# Patient Record
Sex: FEMALE | URBAN - METROPOLITAN AREA
[De-identification: names, ages, dates, MRNs, and addresses within clinical notes are randomized per-mention and may not be internally consistent; named-entity substitution may affect disease eponyms.]

---

## 2024-03-20 ENCOUNTER — OFFICE VISIT (OUTPATIENT)
Age: 74
End: 2024-03-20

## 2024-03-20 DIAGNOSIS — M21.612 BUNION OF GREAT TOE OF LEFT FOOT: ICD-10-CM

## 2024-03-20 DIAGNOSIS — M19.072 PRIMARY OSTEOARTHRITIS OF LEFT FOOT: Primary | ICD-10-CM

## 2024-03-20 RX ORDER — LIDOCAINE HYDROCHLORIDE 10 MG/ML
0.5 INJECTION, SOLUTION INFILTRATION; PERINEURAL ONCE
Status: COMPLETED | OUTPATIENT
Start: 2024-03-20 | End: 2024-03-20

## 2024-03-20 RX ADMIN — LIDOCAINE HYDROCHLORIDE 0.5 ML: 10 INJECTION, SOLUTION INFILTRATION; PERINEURAL at 12:53

## 2024-03-20 NOTE — PATIENT INSTRUCTIONS
WRAPUP INSTRUCTIONS FOR MARIA R TEAM         [x] Follow-up with Mike Celeste MD  in 6 weeks.

## 2024-03-20 NOTE — PROGRESS NOTES
have resulted in some phonetic based errors in grammar and contents. Even though attempts were made to correct all the mistakes, some may have been missed, and remained in the body of the document. If questions arise, please contact our department.     An electronic signature was used to authenticate this note.    Urvashi Lopes may have a reminder for a \"due or due soon\" health maintenance. I have asked that she contact her primary care provider for follow-up on this health maintenance.         Documentation by cristóbal Alvarez, as dictated by Mike Celeste MD on 3/20/2024.

## 2024-04-30 ENCOUNTER — OFFICE VISIT (OUTPATIENT)
Age: 74
End: 2024-04-30
Payer: COMMERCIAL

## 2024-04-30 DIAGNOSIS — M21.612 BUNION OF GREAT TOE OF LEFT FOOT: Primary | ICD-10-CM

## 2024-04-30 DIAGNOSIS — M19.072 PRIMARY OSTEOARTHRITIS OF LEFT FOOT: ICD-10-CM

## 2024-04-30 PROCEDURE — 1123F ACP DISCUSS/DSCN MKR DOCD: CPT | Performed by: ORTHOPAEDIC SURGERY

## 2024-04-30 PROCEDURE — 99213 OFFICE O/P EST LOW 20 MIN: CPT | Performed by: ORTHOPAEDIC SURGERY

## 2024-04-30 NOTE — PROGRESS NOTES
AMBULATORY PROGRESS NOTE      Patient: Urvashi Lopes             MRN: [id number]     SSN: [id number] There is no height or weight on file to calculate BMI.  YOB: 1950     AGE: 73 y.o.       EX: female    PCP: Unknown, Provider, APRN - NP       IMPRESSION //  DIAGNOSIS AND TREATMENT PLAN      Urvashi Lopes has a diagnosis of:      Follow up as needed (PRN) as: Urvashi Lopes is doing well and is asymptomatic on evaluation and per HPI is doing much better.    PRN RTO to see me as Urvashi Lopes is doing well and having no tenderness on examination 4/30/2024      DIAGNOSES    1. Bunion of great toe of left foot    2. Primary osteoarthritis of left foot          PLAN:    1. Recommended wearing stiffer sandals casually and supportive tennis shoes while walking      RTO PRN    No orders of the defined types were placed in this encounter.       There are no Patient Instructions on file for this visit.      Please follow up with your PCP for any health maintenance as recommended.         Urvashi Lopes  expresses understanding of the diagnosis, treatment plan, and all of their proposed questions were answered to their satisfaction. Patient education has been provided re the diagnoses.         HPI //  OBJECTIVE EXAMINATION      Urvashi Lopes IS A 73 y.o. female who is a/an  established patient, presenting to my outpatient office for evaluation of  the following chief complaint(s):     Chief Complaint   Patient presents with    Follow-up     Left great toe       Patient presents for a left foot follow up.    She reports feeling immediate pain relief to the great toe since the injection last visit. She has a slight discomfort to the great toe but is much more tolerable compared to last visit.    Patient is unemployed at this time.     There were no vitals taken for this visit.    Appearance: Alert, well appearing and pleasant patient who is in no distress, oriented to person,

## 2025-04-03 ENCOUNTER — OFFICE VISIT (OUTPATIENT)
Age: 75
End: 2025-04-03

## 2025-04-03 DIAGNOSIS — M19.079 OSTEOARTHRITIS OF TOE: Primary | ICD-10-CM

## 2025-04-03 DIAGNOSIS — M79.674 PAIN OF TOE OF RIGHT FOOT: ICD-10-CM

## 2025-04-03 NOTE — PROGRESS NOTES
AMBULATORY PROGRESS NOTE      Patient: Urvashi Lopes             MRN: [id number]     SSN: [id number] There is no height or weight on file to calculate BMI.  YOB: 1950     AGE: 74 y.o.       EX: female  OFFICE VISIT DATE OF SERVICE:  4/3/25     PCP: Unknown, Provider, ANP       IMPRESSION //  DIAGNOSIS AND TREATMENT PLAN  4/3/2025       Urvashi Lopes has a diagnosis of:      DIAGNOSES    1. Osteoarthritis of toe    2. Pain of toe of right foot          PLAN:    1.  Cortisone injection performed to the right great toe first MTP using sterile technique      RTO 6 to 8-week follow-up    Orders Placed This Encounter    DRAIN/INJECT SMALL JOINT/BURSA    [96804] Foot Min 3V    triamcinolone acetonide (KENALOG) injection 5 mg        Patient Instructions   Ice 10 minutes on, 20 minutes off  Bandaide can come off in 4 hours.       Please follow up with your PCP for any health maintenance as recommended.       Urvashi Lopes  expresses understanding of the diagnosis, treatment plan, and all of their proposed questions were answered to their satisfaction. Patient education has been provided re the diagnoses.       HPI //  OBJECTIVE EXAMINATION      Urvashi Lopes IS A 74 y.o. female who is a/an  established patient, presenting to my outpatient office for evaluation of  the following chief complaint(s):     Chief Complaint   Patient presents with    Foot Pain     Right great toe       She is having pain discomfort the right great toe first MTP.  No history of trauma.  I saw her in the past, where she had some early OA of her left great toe MTP joint had a cortisone injection in this left forefoot and did well from this.  Next chief complaint, is active related pain right great toe present DP skin no history of inflammatory neuropathy.  Hurts when she flexes and extends her toe and she feels she has to walk on toes out of her foot, and avoiding skin type pattern    Patients is employed